# Patient Record
Sex: FEMALE | Race: BLACK OR AFRICAN AMERICAN | NOT HISPANIC OR LATINO | Employment: FULL TIME | URBAN - METROPOLITAN AREA
[De-identification: names, ages, dates, MRNs, and addresses within clinical notes are randomized per-mention and may not be internally consistent; named-entity substitution may affect disease eponyms.]

---

## 2018-02-05 ENCOUNTER — CLINICAL SUPPORT (OUTPATIENT)
Dept: AUDIOLOGY | Facility: CLINIC | Age: 58
End: 2018-02-05
Payer: COMMERCIAL

## 2018-02-05 ENCOUNTER — OFFICE VISIT (OUTPATIENT)
Dept: OTOLARYNGOLOGY | Facility: CLINIC | Age: 58
End: 2018-02-05
Payer: COMMERCIAL

## 2018-02-05 VITALS
SYSTOLIC BLOOD PRESSURE: 146 MMHG | HEART RATE: 89 BPM | HEIGHT: 67 IN | DIASTOLIC BLOOD PRESSURE: 86 MMHG | WEIGHT: 152 LBS | BODY MASS INDEX: 23.86 KG/M2

## 2018-02-05 DIAGNOSIS — H61.23 EXCESSIVE CERUMEN IN BOTH EAR CANALS: ICD-10-CM

## 2018-02-05 DIAGNOSIS — H90.3 BILATERAL HIGH FREQUENCY SENSORINEURAL HEARING LOSS: ICD-10-CM

## 2018-02-05 DIAGNOSIS — H92.01 RIGHT EAR PAIN: Primary | ICD-10-CM

## 2018-02-05 DIAGNOSIS — H90.3 SENSORINEURAL HEARING LOSS, BILATERAL: Primary | ICD-10-CM

## 2018-02-05 PROCEDURE — 3008F BODY MASS INDEX DOCD: CPT | Mod: S$GLB,,, | Performed by: NURSE PRACTITIONER

## 2018-02-05 PROCEDURE — 92567 TYMPANOMETRY: CPT | Mod: S$GLB,,, | Performed by: PHYSICIAN ASSISTANT

## 2018-02-05 PROCEDURE — 92557 COMPREHENSIVE HEARING TEST: CPT | Mod: S$GLB,,, | Performed by: PHYSICIAN ASSISTANT

## 2018-02-05 PROCEDURE — 99999 PR PBB SHADOW E&M-NEW PATIENT-LVL I: CPT | Mod: PBBFAC,,,

## 2018-02-05 PROCEDURE — 99999 PR PBB SHADOW E&M-EST. PATIENT-LVL III: CPT | Mod: PBBFAC,,, | Performed by: NURSE PRACTITIONER

## 2018-02-05 PROCEDURE — 99203 OFFICE O/P NEW LOW 30 MIN: CPT | Mod: 25,S$GLB,, | Performed by: NURSE PRACTITIONER

## 2018-02-05 PROCEDURE — 69210 REMOVE IMPACTED EAR WAX UNI: CPT | Mod: S$GLB,,, | Performed by: NURSE PRACTITIONER

## 2018-02-05 NOTE — PATIENT INSTRUCTIONS
"Encouraged yearly ear cleanings.  The "gold standard" for determining presence or absence of middle ear fluid is tympanometry. Type "A" tympanogram is consistent with well-aerated middle ear space and absence of middle ear fluid. There is no remaining middle ear fluid/infection at this time. Other possible causes for continued ear pain can include but are not limited to: dental pathology, TMJ (jaw joint arthritis), cervical spine arthritis, heck/neck neoplasms, myofascial pain syndrome/headaches, neuralgias, shingles, GERD.  Audiogram Reviewed: B SNHL. Type A AU tympanograms.  Noise and hearing protection pamphlet provided.  Hearing conservation strongly recommended.  Trial of amplification is not currently indicated.  Re-check of hearing in 18-24 months.  F/U with PCP as per schedule.  RTC prn.   "

## 2018-02-05 NOTE — PROGRESS NOTES
Audiological Evaluation:    Test results indicated a mild to moderate SNHL AU with good speech discrimination scores bilaterally.  Normal tympanograms bilaterally.      Recommend:  1.  Otologic evaluation  2.  Annual hearing evaluations  3.  Noise protection

## 2018-02-07 NOTE — PROGRESS NOTES
Subjective:       Patient ID: Sherry Melton is a 57 y.o. female.    Chief Complaint: Otalgia and Ear Fullness    HPI   Elizabeth Melton is a 56 y/o female who presents with a several week h/o B ear pain. Symptoms are improving. She denies otologic surgery, loud noise exposure, otalgia, tinnitus, dizziness, or vertigo. No treatments tried. No head or neck trauma. No sinus problems.    Past Medical History: Patient has no past medical history on file.    Past Surgical History: Patient has no past surgical history on file.    Social History: Patient reports that she has never smoked. She does not have any smokeless tobacco history on file. She reports that she drinks alcohol. She reports that she does not use drugs.    Family History: family history is not on file.    Medications:   No current outpatient prescriptions on file.     No current facility-administered medications for this visit.        Allergies: Patient has No Known Allergies.    Review of Systems   Constitutional: Negative for activity change, appetite change, chills, diaphoresis, fatigue, fever and unexpected weight change.   HENT: Positive for ear pain. Negative for congestion, dental problem, drooling, ear discharge, facial swelling, hearing loss, mouth sores, nosebleeds, postnasal drip, rhinorrhea, sinus pain, sinus pressure, sneezing, sore throat, tinnitus, trouble swallowing and voice change.    Eyes: Negative for pain and visual disturbance.   Respiratory: Negative for cough, chest tightness, shortness of breath, wheezing and stridor.    Cardiovascular: Negative for chest pain.   Musculoskeletal: Negative for gait problem and neck pain.   Skin: Negative for color change and rash.   Allergic/Immunologic: Negative for environmental allergies.   Neurological: Negative for dizziness, seizures, syncope, facial asymmetry, speech difficulty, weakness, light-headedness, numbness and headaches.   Psychiatric/Behavioral: Negative for agitation and confusion.  "The patient is not nervous/anxious.        Objective:       BP (!) 146/86 (BP Location: Right arm, Patient Position: Sitting, BP Method: Medium (Automatic))   Pulse 89   Ht 5' 7" (1.702 m)   Wt 68.9 kg (152 lb)   BMI 23.81 kg/m²     Physical Exam   Constitutional: She is oriented to person, place, and time. She appears well-developed and well-nourished.   HENT:   Head: Normocephalic and atraumatic. Not macrocephalic and not microcephalic. Head is without raccoon's eyes, without Ba's sign, without abrasion, without contusion, without laceration, without right periorbital erythema and without left periorbital erythema. Hair is normal.   Right Ear: Tympanic membrane, external ear and ear canal normal. No lacerations. No drainage, swelling or tenderness. No foreign bodies. No mastoid tenderness. Tympanic membrane is not injected, not scarred, not perforated, not erythematous, not retracted and not bulging. Tympanic membrane mobility is normal. No middle ear effusion. No hemotympanum. Decreased hearing is noted.   Left Ear: Tympanic membrane, external ear and ear canal normal. No lacerations. No drainage, swelling or tenderness. No foreign bodies. No mastoid tenderness. Tympanic membrane is not injected, not scarred, not perforated, not erythematous, not retracted and not bulging. Tympanic membrane mobility is normal.  No middle ear effusion. No hemotympanum. Decreased hearing is noted.   Nose: Nose normal. No mucosal edema, rhinorrhea, nose lacerations, sinus tenderness, nasal deformity or nasal septal hematoma. No epistaxis.  No foreign bodies. Right sinus exhibits no maxillary sinus tenderness and no frontal sinus tenderness. Left sinus exhibits no maxillary sinus tenderness and no frontal sinus tenderness.   Mouth/Throat: Uvula is midline, oropharynx is clear and moist and mucous membranes are normal.   PROCEDURE NOTE:  Excessive ceruminosis is noted in both EACs.  Wax was removed by manual debridement and " "suctioning utilizing the assistance of binocular microscopy revealing EACs and TMs WNL. The patient tolerated this procedure without difficulty.     No AOM or OE.  No mastoid, tenderness, swelling, or redness.  Facial nerve intact.     Eyes: Conjunctivae, EOM and lids are normal. Pupils are equal, round, and reactive to light.   Neck: Trachea normal and normal range of motion. Neck supple. No spinous process tenderness and no muscular tenderness present. No neck rigidity. No edema, no erythema and normal range of motion present. No thyroid mass and no thyromegaly present.   Pulmonary/Chest: Effort normal.   Abdominal: Soft.   Musculoskeletal: Normal range of motion.   Lymphadenopathy:        Head (right side): No submental, no submandibular, no tonsillar, no preauricular and no posterior auricular adenopathy present.        Head (left side): No submental, no submandibular, no tonsillar, no preauricular, no posterior auricular and no occipital adenopathy present.     She has no cervical adenopathy.   Neurological: She is alert and oriented to person, place, and time. No cranial nerve deficit or sensory deficit.   Skin: Skin is warm and dry.   Psychiatric: She has a normal mood and affect. Her behavior is normal. Judgment and thought content normal.   Nursing note and vitals reviewed.      As a result of this patients history and examination findings, a comprehensive audiogram was ordered to determine the level of hearing/hearing loss.        Assessment:       1. Right ear pain    2. Excessive cerumen in both ear canals    3. Bilateral high frequency sensorineural hearing loss        Plan:       Encouraged yearly ear cleanings.  The "gold standard" for determining presence or absence of middle ear fluid is tympanometry. Type "A" tympanogram is consistent with well-aerated middle ear space and absence of middle ear fluid. There is no remaining middle ear fluid/infection at this time. Other possible causes for continued " ear pain can include but are not limited to: dental pathology, TMJ (jaw joint arthritis), cervical spine arthritis, heck/neck neoplasms, myofascial pain syndrome/headaches, neuralgias, shingles, GERD.  Audiogram Reviewed: B SNHL. Type A AU tympanograms.  Noise and hearing protection pamphlet provided.  Hearing conservation strongly recommended.  Trial of amplification is not currently indicated.  Re-check of hearing in 18-24 months.  F/U with PCP as per schedule.  RTC prn.